# Patient Record
Sex: FEMALE
[De-identification: names, ages, dates, MRNs, and addresses within clinical notes are randomized per-mention and may not be internally consistent; named-entity substitution may affect disease eponyms.]

---

## 2021-12-28 ENCOUNTER — DASHBOARD ENCOUNTERS (OUTPATIENT)
Age: 41
End: 2021-12-28

## 2021-12-28 ENCOUNTER — OFFICE VISIT (OUTPATIENT)
Dept: URBAN - METROPOLITAN AREA CLINIC 113 | Facility: CLINIC | Age: 41
End: 2021-12-28
Payer: COMMERCIAL

## 2021-12-28 ENCOUNTER — WEB ENCOUNTER (OUTPATIENT)
Dept: URBAN - METROPOLITAN AREA CLINIC 113 | Facility: CLINIC | Age: 41
End: 2021-12-28

## 2021-12-28 VITALS — BODY MASS INDEX: 42.44 KG/M2 | HEIGHT: 68 IN | TEMPERATURE: 98.2 F | WEIGHT: 280 LBS | RESPIRATION RATE: 18 BRPM

## 2021-12-28 DIAGNOSIS — R74.8 ELEVATED LIVER ENZYMES: ICD-10-CM

## 2021-12-28 DIAGNOSIS — R10.33 UMBILICAL PAIN: ICD-10-CM

## 2021-12-28 DIAGNOSIS — R76.8 ANTIMITOCHONDRIAL ANTIBODY POSITIVE: ICD-10-CM

## 2021-12-28 DIAGNOSIS — K62.5 BRIGHT RED BLOOD PER RECTUM: ICD-10-CM

## 2021-12-28 DIAGNOSIS — R19.7 DIARRHEA: ICD-10-CM

## 2021-12-28 PROCEDURE — 99204 OFFICE O/P NEW MOD 45 MIN: CPT | Performed by: NURSE PRACTITIONER

## 2021-12-28 RX ORDER — METFORMIN HYDROCHLORIDE 1000 MG/1
1 TABLET WITH A MEAL TABLET, FILM COATED ORAL TWICE DAILY
Status: ACTIVE | COMMUNITY

## 2021-12-28 RX ORDER — DULAGLUTIDE 0.75 MG/.5ML
AS DIRECTED INJECTION, SOLUTION SUBCUTANEOUS
Status: ACTIVE | COMMUNITY

## 2021-12-28 RX ORDER — GABAPENTIN 100 MG/1
2 CAPSULES CAPSULE ORAL ONCE A DAY
Status: ACTIVE | COMMUNITY

## 2021-12-28 RX ORDER — SODIUM, POTASSIUM,MAG SULFATES 17.5-3.13G
354 ML SOLUTION, RECONSTITUTED, ORAL ORAL ONCE
Qty: 354 MILLILITER | Refills: 0 | OUTPATIENT
Start: 2021-12-28 | End: 2021-12-29

## 2021-12-28 RX ORDER — LEVOTHYROXINE SODIUM 0.11 MG/1
1 TABLET IN THE MORNING ON AN EMPTY STOMACH TABLET ORAL ONCE A DAY
Status: ACTIVE | COMMUNITY

## 2021-12-28 NOTE — HPI-TODAY'S VISIT:
42yo female with a history of hypertension, diabetes, hypothyroidism, anxiety and depression, sleep apnea, presenting for evaluation of abdominal pain. This is a second opinion.  She has been experiencing abdominal pain and diarrhea since the birth of her daughter last year.  This has been worsening within the last 2 months. She complains of intermittent exacerbations of sharp umbilical pain which will radiate to the right lower quadrant. The discomfort is worsened following a meal leading to significant nausea without vomiting. She complains of increasing diarrhea and is having 2 loose, runny stools daily to every other day. Her symptoms are worsened with dietary indiscretion, such as following a greasy meal. She has occasionl red blood per rectum with wiping. Once to twice per week, she will experience nocturnal stools with urgency. She takes Imodium 2-3x per week which is helpful; this tends to result in constipation. She has previously seen Dr. De Leon and recommended an upper endoscopy, but did not proceed with the procedure. She denies NSAID use. Her maternal aunt had pancreatic cancer. She was on antibiotics and steroids in September following an adverse reaction to the COVID vaccine. Interestingly, her abdominal symptoms subsided while she was on the medications. Abdominal ultrasound 8/5/2021:Increased hepatic echogenicity which can be seen in hepatocellular disease such as hepatic steatosis. Labs 10/29/2021:Negative celiac serologies.  AST 95, , ALP 68, T bili 0.3. 9/2/2021:, , ALP 79, T bili 0.3.  Elevated serum immunoglobulin A, G.  Normal serum immunoglobulin D, M.  PT/INR 10.8/1.0.  Normal alpha-1 antitrypsin.  Reactive hepatitis B surface antibody.  Negative hepatitis A antibody IgM and total.  hepatitis B surface antigen, hepatitis B core antibody IgM and total, hepatitis C antibody.  Positive AMA, negative LYN and ASMA.  Negative Shannon-Barclay DNA PCR.  Negative HSV, CMV.  Normal ceruloplasmin. 7/28/2021:, , , T bili 0.4, albumin 4.3, total protein 8. 7/7/2021:H/H 13.4/41.6, MCV 91, , WBC 5.6.  , , , T bili 0.3, CMP otherwise unremarkable aside from glucose 437.  A1c 11.3.

## 2021-12-30 ENCOUNTER — TELEPHONE ENCOUNTER (OUTPATIENT)
Dept: URBAN - METROPOLITAN AREA CLINIC 113 | Facility: CLINIC | Age: 41
End: 2021-12-30

## 2021-12-30 LAB
A/G RATIO: 1.2
ACTIN (SMOOTH MUSCLE) ANTIBODY: 20
ALBUMIN: 4.4
ALKALINE PHOSPHATASE: 63
ALT (SGPT): 77
ANA DIRECT: NEGATIVE
AST (SGOT): 84
BASO (ABSOLUTE): 0.1
BASOS: 1
BILIRUBIN, TOTAL: 0.3
BUN/CREATININE RATIO: 11
BUN: 7
CALCIUM: 9.5
CARBON DIOXIDE, TOTAL: 20
CHLORIDE: 98
CREATININE: 0.61
EGFR IF AFRICN AM: 130
EGFR IF NONAFRICN AM: 113
EOS (ABSOLUTE): 0.2
EOS: 2
FERRITIN, SERUM: 69
GLOBULIN, TOTAL: 3.6
GLUCOSE: 103
HEMATOCRIT: 42.5
HEMATOLOGY COMMENTS:: (no result)
HEMOGLOBIN: 13.5
IMMATURE CELLS: (no result)
IMMATURE GRANS (ABS): 0
IMMATURE GRANULOCYTES: 0
IMMUNOGLOBULIN A, QN, SERUM: 491
IMMUNOGLOBULIN E, TOTAL: 60
IMMUNOGLOBULIN G, QN, SERUM: 1674
IMMUNOGLOBULIN M, QN, SERUM: 94
INR: 1
LYMPHS (ABSOLUTE): 2.9
LYMPHS: 33
MCH: 28
MCHC: 31.8
MCV: 88
MITOCHONDRIAL (M2) ANTIBODY: 22.3
MONOCYTES(ABSOLUTE): 0.4
MONOCYTES: 5
NEUTROPHILS (ABSOLUTE): 5.1
NEUTROPHILS: 59
NRBC: (no result)
PLATELETS: 384
POTASSIUM: 4.5
PROTEIN, TOTAL: 8
PROTHROMBIN TIME: 10.4
RBC: 4.82
RDW: 11.8
SODIUM: 137
WBC: 8.6

## 2022-01-06 ENCOUNTER — TELEPHONE ENCOUNTER (OUTPATIENT)
Dept: URBAN - METROPOLITAN AREA CLINIC 113 | Facility: CLINIC | Age: 42
End: 2022-01-06

## 2022-01-06 RX ORDER — POLYETHYLENE GLYCOL 3350, SODIUM CHLORIDE, SODIUM BICARBONATE, POTASSIUM CHLORIDE 420; 11.2; 5.72; 1.48 G/4L; G/4L; G/4L; G/4L
AS DIRECTED POWDER, FOR SOLUTION ORAL
Qty: 420 GM | Refills: 0 | OUTPATIENT
Start: 2022-01-06 | End: 2022-01-07

## 2022-01-09 LAB
C DIFFICILE TOXINS A+B, EIA: NEGATIVE
CAMPYLOBACTER CULTURE: (no result)
E COLI SHIGA TOXIN EIA: NEGATIVE
Lab: (no result)
OVA + PARASITE EXAM: (no result)
SALMONELLA/SHIGELLA SCREEN: (no result)

## 2022-01-10 ENCOUNTER — TELEPHONE ENCOUNTER (OUTPATIENT)
Dept: URBAN - METROPOLITAN AREA CLINIC 40 | Facility: CLINIC | Age: 42
End: 2022-01-10

## 2022-01-13 ENCOUNTER — OFFICE VISIT (OUTPATIENT)
Dept: URBAN - METROPOLITAN AREA SURGERY CENTER 25 | Facility: SURGERY CENTER | Age: 42
End: 2022-01-13

## 2022-01-18 ENCOUNTER — TELEPHONE ENCOUNTER (OUTPATIENT)
Dept: URBAN - METROPOLITAN AREA CLINIC 113 | Facility: CLINIC | Age: 42
End: 2022-01-18

## 2022-01-26 ENCOUNTER — OFFICE VISIT (OUTPATIENT)
Dept: URBAN - METROPOLITAN AREA MEDICAL CENTER 19 | Facility: MEDICAL CENTER | Age: 42
End: 2022-01-26

## 2022-01-27 ENCOUNTER — LAB OUTSIDE AN ENCOUNTER (OUTPATIENT)
Dept: URBAN - METROPOLITAN AREA CLINIC 113 | Facility: CLINIC | Age: 42
End: 2022-01-27

## 2022-08-15 ENCOUNTER — TELEPHONE ENCOUNTER (OUTPATIENT)
Dept: URBAN - METROPOLITAN AREA CLINIC 113 | Facility: CLINIC | Age: 42
End: 2022-08-15

## 2022-08-15 PROBLEM — 197321007 STEATOSIS OF LIVER: Status: ACTIVE | Noted: 2022-08-15
